# Patient Record
Sex: MALE | Race: WHITE | NOT HISPANIC OR LATINO | Employment: UNEMPLOYED | URBAN - METROPOLITAN AREA
[De-identification: names, ages, dates, MRNs, and addresses within clinical notes are randomized per-mention and may not be internally consistent; named-entity substitution may affect disease eponyms.]

---

## 2018-03-08 ENCOUNTER — OFFICE VISIT (OUTPATIENT)
Dept: FAMILY MEDICINE | Facility: CLINIC | Age: 18
End: 2018-03-08

## 2018-03-08 VITALS
WEIGHT: 166.8 LBS | HEIGHT: 71 IN | HEART RATE: 68 BPM | OXYGEN SATURATION: 98 % | TEMPERATURE: 97.7 F | DIASTOLIC BLOOD PRESSURE: 66 MMHG | SYSTOLIC BLOOD PRESSURE: 98 MMHG | BODY MASS INDEX: 23.35 KG/M2

## 2018-03-08 DIAGNOSIS — Z71.89 ACP (ADVANCE CARE PLANNING): ICD-10-CM

## 2018-03-08 DIAGNOSIS — Z91.018 TREE NUT ALLERGY: Primary | ICD-10-CM

## 2018-03-08 PROCEDURE — 99213 OFFICE O/P EST LOW 20 MIN: CPT | Performed by: FAMILY MEDICINE

## 2018-03-08 RX ORDER — EPINEPHRINE 0.3 MG/.3ML
0.3 INJECTION SUBCUTANEOUS PRN
Qty: 0.6 ML | Refills: 1 | Status: SHIPPED | OUTPATIENT
Start: 2018-03-08 | End: 2021-07-28

## 2018-03-08 NOTE — NURSING NOTE
Jurgen Wren is here today to receive a prescription for an Epi-Pen due to allergic reactions to Tree Nuts.    Pre-visit Screening:    Immunizations: not up to date - HPV Series not started (declines at this time)  Asthma Action Test/Plan:  NA  PHQ9:  NA  GAD7:  NA    Questioned patient about current smoking habits Pt. has never smoked.    Is it ok to leave a detailed message on cell phone's voice mail for today's visit only? Yes     Telephone Information:   Mobile [phone]       Nicole Silva [facility]

## 2018-03-08 NOTE — PROGRESS NOTES
"SUBJECTIVE:  Jurgen Wren, a 17 year old male scheduled an appointment to discuss the following issues:     Tree nut allergy  ACP (advance care planning)  Pt here to get epi pen-has never used before but tested positive for tree nut allergy by [facility] in 2011 (also shrimp)-has had reactions when he does have nuts-used to be mouth tingling and increased saliva- last 2 episodes his throat felt full or possibly swollen-no choking, no shortness of breath     Pt in college and will be studying in [place] next semester    Medical, social, surgical, and family histories reviewed.    ROS:  CONSTITUTIONAL: NEGATIVE for fever, chills  EYES: NEGATIVE for vision changes   RESP: NEGATIVE for significant cough or SOB  CV: NEGATIVE for chest pain, palpitations   GI: NEGATIVE for nausea, abdominal pain, heartburn, or change in bowel habits  : NEGATIVE for frequency, dysuria, or hematuria  MUSCULOSKELETAL: NEGATIVE for significant arthralgias or myalgia  NEURO: NEGATIVE for weakness, dizziness or paresthesias or headache    OBJECTIVE:  BP 98/66 (BP Location: Right arm, Patient Position: Chair, Cuff Size: Adult Large)  Pulse 68  Temp 97.7  F (36.5  C) (Oral)  Ht 1.803 m (5' 11\")  Wt 75.7 kg (166 lb 12.8 oz)  SpO2 98%  BMI 23.26 kg/m2  EXAM:  GENERAL APPEARANCE: healthy, alert and no distress  EYES: EOMI,  PERRL  HENT: ear canals and TM's normal and nose and mouth without ulcers or lesions  RESP: lungs clear to auscultation - no rales, rhonchi or wheezes  CV: regular rates and rhythm, normal S1 S2, no S3 or S4 and no murmur, click or rub -  ABDOMEN:  soft, nontender, no HSM or masses and bowel sounds normal    ASSESSMENT/PLAN:  (Z91.018) Tree nut allergy  (primary encounter diagnosis)  Comment: definitely at risk for anaphylaxis given progressing reactions-doubt allergy eval would be of much benefit at this junture-recommend always having epi pen  Plan: EPINEPHrine (ADRENACLICK) 0.3 MG/0.3ML         injection 2-pack        If " uses needs to go to ED immediately afterward for observation.    Of note-he has never had issues with shrimp or shellfish    (Z71.89) ACP (advance care planning)  Comment:   Plan:

## 2018-03-08 NOTE — MR AVS SNAPSHOT
"              After Visit Summary   3/8/2018    Jurgen Wren    MRN: [id number]           Patient Information     Date Of Birth          2000        Visit Information        Provider Department      3/8/2018 12:15 PM Andrei Burleson MD [facility]        Today's Diagnoses     Tree nut allergy    -  1    ACP (advance care planning)           Follow-ups after your visit        Who to contact     If you have questions or need follow up information about today's clinic visit or your schedule please contact [facility] directly at [phone].  Normal or non-critical lab and imaging results will be communicated to you by Xby[employer], letter or phone within 4 business days after the clinic has received the results. If you do not hear from us within 7 days, please contact the clinic through XbyMehart or phone. If you have a critical or abnormal lab result, we will notify you by phone as soon as possible.  Submit refill requests through [employer] or call your pharmacy and they will forward the refill request to us. Please allow 3 business days for your refill to be completed.          Additional Information About Your Visit        MyChart Information     [employer] lets you send messages to your doctor, view your test results, renew your prescriptions, schedule appointments and more. To sign up, go to [email or web address] . Click on \"Log in\" on the left side of the screen, which will take you to the Welcome page. Then click on \"Sign up Now\" on the right side of the page.     You will be asked to enter the access code listed below, as well as some personal information. Please follow the directions to create your username and password.     Your access code is: [email or web address]  Expires: 2018  1:03 PM     Your access code will  in 90 days. If you need help or a new code, please call your [place] clinic or [phone].        Care EveryWhere ID     This is your " "Care EveryWhere ID. This could be used by other organizations to access your [facility] medical records  [id number]        Your Vitals Were     Pulse Temperature Height Pulse Oximetry BMI (Body Mass Index)       68 97.7  F (36.5  C) (Oral) 1.803 m (5' 11\") 98% 23.26 kg/m2        Blood Pressure from Last 3 Encounters:   03/08/18 98/66   06/09/16 108/62   02/29/16 108/68    Weight from Last 3 Encounters:   03/08/18 75.7 kg (166 lb 12.8 oz) (68 %)*   06/09/16 71.4 kg (157 lb 6.4 oz) (65 %)*   02/29/16 69.1 kg (152 lb 6 oz) (60 %)*     * Growth percentiles are based on [facility] 2-20 Years data.              Today, you had the following     No orders found for display         Today's Medication Changes          These changes are accurate as of 3/8/18  1:03 PM.  If you have any questions, ask your nurse or doctor.               Start taking these medicines.        Dose/Directions    EPINEPHrine 0.3 MG/0.3ML injection 2-pack   Commonly known as:  ADRENACLICK   Used for:  Tree nut allergy   Started by:  Andrei Burleson MD        Dose:  0.3 mg   Inject 0.3 mLs (0.3 mg) into the muscle as needed for anaphylaxis   Quantity:  0.6 mL   Refills:  1            Where to get your medicines      Some of these will need a paper prescription and others can be bought over the counter.  Ask your nurse if you have questions.     Bring a paper prescription for each of these medications     EPINEPHrine 0.3 MG/0.3ML injection 2-pack                Primary Care Provider Office Phone # Fax #    Andrei Burleson [phone] [address] [address]        Equal Access to Services     ZEINA BOSWELL AH: Rena Hatfield, colby duncan, qaybta kaalmakathleen dailey, gretta garcia. So [facility] [phone].    ATENCIÓN: Si habla español, tiene a taylor disposición servicios gratuitos de asistencia lingüística. Lljeff al [phone].    We comply with applicable " federal civil rights laws and [place] laws. We do not discriminate on the basis of race, color, national origin, age, disability, sex, sexual orientation, or gender identity.            Thank you!     Thank you for choosing [facility]  for your care. Our goal is always to provide you with excellent care. Hearing back from our patients is one way we can continue to improve our services. Please take a few minutes to complete the written survey that you may receive in the mail after your visit with us. Thank you!             Your Updated Medication List - Protect others around you: Learn how to safely use, store and throw away your medicines at www.disposemymeds.org.          This list is accurate as of 3/8/18  1:03 PM.  Always use your most recent med list.                   Brand Name Dispense Instructions for use Diagnosis    EPINEPHrine 0.3 MG/0.3ML injection 2-pack    ADRENACLICK    0.6 mL    Inject 0.3 mLs (0.3 mg) into the muscle as needed for anaphylaxis    Tree nut allergy

## 2019-07-23 ENCOUNTER — OFFICE VISIT (OUTPATIENT)
Dept: FAMILY MEDICINE | Facility: CLINIC | Age: 19
End: 2019-07-23

## 2019-07-23 VITALS
DIASTOLIC BLOOD PRESSURE: 64 MMHG | BODY MASS INDEX: 23.78 KG/M2 | HEIGHT: 72 IN | SYSTOLIC BLOOD PRESSURE: 108 MMHG | RESPIRATION RATE: 16 BRPM | WEIGHT: 175.6 LBS | HEART RATE: 72 BPM | OXYGEN SATURATION: 98 % | TEMPERATURE: 98.3 F

## 2019-07-23 DIAGNOSIS — H61.23 BILATERAL IMPACTED CERUMEN: Primary | ICD-10-CM

## 2019-07-23 PROCEDURE — 99212 OFFICE O/P EST SF 10 MIN: CPT | Performed by: FAMILY MEDICINE

## 2019-07-23 ASSESSMENT — MIFFLIN-ST. JEOR: SCORE: 1836.58

## 2019-07-23 NOTE — PROGRESS NOTES
SUBJECTIVE: 18 year old male complaining of swimming yesterday and feeling like water is stuck in his left ear/ uses Q tips.   The patient describes decreases hearing and some ear canal itching.   The patient denies a history of pain or trauma.   Smoking history: No.   Relevant past medical history: positive for tree nut allergy.    OBJECTIVE: The patient appears healthy, alert, no distress, cooperative and smiling.   EARS: Rt TM: normal. Lt TM: normal, positive findings: cerumen bilaterally removed easily  NOSE/SINUS: Nares normal. Septum midline. Mucosa normal. No drainage or sinus tenderness.   THROAT: normal   NECK:Neck supple. No adenopathy. Thyroid symmetric, normal size,, Carotids without bruits.   CHEST: Clear    ASSESSMENT: (H61.23) Bilateral impacted cerumen  (primary encounter diagnosis)  Comment: prevention reviewed  Plan: HC REMOVE IMPACTED CERUMEN, BILATERAL

## 2019-07-23 NOTE — PATIENT INSTRUCTIONS
Bilateral impacted cerumen  (primary encounter diagnosis)  Comment: prevention reviewed  Plan: HC REMOVE IMPACTED CERUMEN, BILATERAL

## 2019-07-23 NOTE — NURSING NOTE
Jurgen is here today for a left plugged ear.    Pre-visit Screening:  Immunizations:  up to date  Colonoscopy:  NA  Mammogram: NA  Asthma Action Test/Plan:  NA  PHQ9:  PHQ 2 done today  GAD7:  NA  Questioned patient about current smoking habits Pt. has never smoked.  Ok to leave detailed message on voice mail for today's visit only Yes, phone # [phone]

## 2019-12-24 ENCOUNTER — TRANSFERRED RECORDS (OUTPATIENT)
Dept: FAMILY MEDICINE | Facility: CLINIC | Age: 19
End: 2019-12-24

## 2019-12-26 DIAGNOSIS — Z53.9 ERRONEOUS ENCOUNTER--DISREGARD: Primary | ICD-10-CM

## 2020-09-17 ENCOUNTER — TELEPHONE (OUTPATIENT)
Dept: FAMILY MEDICINE | Facility: CLINIC | Age: 20
End: 2020-09-17

## 2020-09-17 NOTE — TELEPHONE ENCOUNTER
Should be safe based on his previous health but since I haven't seen him since 3/2018 can't comment if new issues-I think it is fine though

## 2020-09-17 NOTE — TELEPHONE ENCOUNTER
Jurgen called to say that he has been having hair loss so he went on the Internet and purchased a topical solution along with a 1 mg finasteride tablet.  He would like to discuss whether or not this would be safe to take or more damaging?    Patient's phone #[phone]

## 2021-07-28 ENCOUNTER — OFFICE VISIT (OUTPATIENT)
Dept: FAMILY MEDICINE | Facility: CLINIC | Age: 21
End: 2021-07-28

## 2021-07-28 VITALS
HEIGHT: 72 IN | BODY MASS INDEX: 25.6 KG/M2 | OXYGEN SATURATION: 98 % | SYSTOLIC BLOOD PRESSURE: 124 MMHG | DIASTOLIC BLOOD PRESSURE: 68 MMHG | HEART RATE: 77 BPM | TEMPERATURE: 97.4 F | WEIGHT: 189 LBS

## 2021-07-28 DIAGNOSIS — Z91.018 TREE NUT ALLERGY: ICD-10-CM

## 2021-07-28 DIAGNOSIS — Z00.00 ENCOUNTER FOR GENERAL HEALTH EXAMINATION: Primary | ICD-10-CM

## 2021-07-28 DIAGNOSIS — L64.9 MALE PATTERN ALOPECIA: ICD-10-CM

## 2021-07-28 PROCEDURE — 99395 PREV VISIT EST AGE 18-39: CPT | Mod: 25 | Performed by: PHYSICIAN ASSISTANT

## 2021-07-28 PROCEDURE — 90715 TDAP VACCINE 7 YRS/> IM: CPT | Performed by: PHYSICIAN ASSISTANT

## 2021-07-28 PROCEDURE — 90471 IMMUNIZATION ADMIN: CPT | Performed by: PHYSICIAN ASSISTANT

## 2021-07-28 RX ORDER — FINASTERIDE 1 MG/1
1 TABLET, FILM COATED ORAL DAILY
COMMUNITY

## 2021-07-28 RX ORDER — EPINEPHRINE 0.3 MG/.3ML
0.3 INJECTION SUBCUTANEOUS ONCE
Qty: 0.6 ML | Refills: 0 | Status: SHIPPED | OUTPATIENT
Start: 2021-07-28 | End: 2021-07-28

## 2021-07-28 ASSESSMENT — MIFFLIN-ST. JEOR: SCORE: 1890.3

## 2021-07-28 NOTE — NURSING NOTE
Chief Complaint   Patient presents with     Physical     non fasting         Pre-visit Screening:  Immunizations:  Not up to date- tdap today  Colonoscopy:  NA  Mammogram: NA  Asthma Action Test/Plan:  NA  PHQ9:  NA  GAD7:  NA  Questioned patient about current smoking habits Pt. has never smoked.  Ok to leave detailed message on voice mail for today's visit only Yes, phone # cell

## 2021-07-28 NOTE — PROGRESS NOTES
Jurgen Wren is a 21 year old male presents for routine health maintenance.    Current concerns:   Takes finasteride for medication called Keeps. This was started 9/2020. Denies any side effects.     Body mass index is 25.63 kg/m .    Present exercise habits:  3 times/week  Present dietary habits:  eats regular meals and follows a balanced nutrition diet    Vit D intake: is taking supplement    Is the patient a smoker? No  If yes, smoking cessation advised and counseling provided.     Cardiovascular risk factors: none    Over the past few weeks, have you felt down or depressed? Little interest or pleasure in doing things? No concerns    Last dental appointment:  last year  Last optical appointment:  3 years ago    Was the patient born between [birth year] and has not had Hep C testing?  No, not applicable    I have reviewed the following histories: Past Medical History, Past Surgical History, Social History, Family History, Problem List, Medication List and Allergies    Past Medical History:   Diagnosis Date     Tree nut allergy 3/8/2018     Family History   Problem Relation Age of Onset     Asthma No family hx of      Diabetes No family hx of      Cancer - colorectal No family hx of      Prostate Cancer No family hx of      Social History     Socioeconomic History     Marital status: Single     Spouse name: Not on file     Number of children: Not on file     Years of education: Not on file     Highest education level: Not on file   Occupational History     Occupation: college     Employer: [employer]     Comment: [employer]   Tobacco Use     Smoking status: Never Smoker     Smokeless tobacco: Never Used   Substance and Sexual Activity     Alcohol use: Yes     Alcohol/week: 3.0 standard drinks     Types: 3 Standard drinks or equivalent per week     Drug use: No     Sexual activity: Never     Partners: Female   Other Topics Concern      Service Not Asked     Blood Transfusions Not Asked     Caffeine  Concern Not Asked     Occupational Exposure Not Asked     Hobby Hazards Not Asked     Sleep Concern Not Asked     Stress Concern Not Asked     Weight Concern Not Asked     Special Diet Not Asked     Back Care Not Asked     Exercise Yes     Bike Helmet Not Asked     Seat Belt Yes     Self-Exams Not Asked   Social History Narrative     Not on file     Social Determinants of Health     Financial Resource Strain:      Difficulty of Paying Living Expenses:    Food Insecurity:      Worried About Running Out of Food in the Last Year:      Ran Out of Food in the Last Year:    Transportation Needs:      Lack of Transportation (Medical):      Lack of Transportation (Non-Medical):    Physical Activity:      Days of Exercise per Week:      Minutes of Exercise per Session:    Stress:      Feeling of Stress :    Social Connections:      Frequency of Communication with Friends and Family:      Frequency of Social Gatherings with Friends and Family:      Attends Baptism Services:      Active Member of Clubs or Organizations:      Attends Club or Organization Meetings:      Marital Status:    Intimate Partner Violence:      Fear of Current or Ex-Partner:      Emotionally Abused:      Physically Abused:      Sexually Abused:      Patient Active Problem List   Diagnosis     Allergic rhinitis     Health Care Home     ACP (advance care planning)     Tree nut allergy     Current Outpatient Medications   Medication     EPINEPHrine (ADRENACLICK) 0.3 MG/0.3ML injection 2-pack     finasteride (PROPECIA) 1 MG tablet     No current facility-administered medications for this visit.       Allergies:    Allergies   Allergen Reactions     Nuts [Peanut-Derived] Anaphylaxis and Swelling     Tree nuts, nausea, tingling tongue         ROS:  E/M: NEGATIVE for ear, nose, mouth and throat problems  R: NEGATIVE for significant/chronic cough or SOB  CV: NEGATIVE for chest pain or palpitations  GI: NEGATIVE for abdominal pain, chronic diarrhea or  constipation  : NEGATIVE for dysuria, hematuria, weakened urinary stream      OBJECTIVE:    Vitals:    07/28/21 1112   BP: 124/68   BP Location: Left arm   Patient Position: Sitting   Cuff Size: Adult Large   Pulse: 77   Temp: 97.4  F (36.3  C)   TempSrc: Temporal   SpO2: 98%   Weight: 85.7 kg (189 lb)   Height: 1.829 m (6')       General: 21 year old male who appears his stated age. Vital signs noted.  Head: Normocephalic  Eyes: pupils equal round reactive to light and accomodation, extra ocular movements intact  Ears: external canals and tms free of abnormalities  Nose: patent, without mucosal abnormalities  Mouth and throat: without erythema or lesions of the mucosa  Neck: supple, without adenopathy or thyromegaly  Lungs: clear to auscultation, no wheezing or crackles  Cv: regular rate and rhythm, normal s1 and s2 without murmur or click  Abd: soft, non-tender, no masses, no hepatomegaly or splenomegaly.  Gu: normal external genitalia, no hernia  Rectal: Not indicated  Ms: normal muscle tone & symmetry  Skin: Clear to inspection  Neuro: sensation and motor function grossly intact; cranial nerves without obvious abnormalities.    ASSESSMENT/PLAN:    1. Encounter for general health examination  Jurgen is doing well today. Will wait until next physical for fasting labs. Contact us sooner if concerns. Updating tetanus today.     2. Tree nut allergy  - EPINEPHrine (ADRENACLICK) 0.3 MG/0.3ML injection 2-pack; Inject 0.3 mLs (0.3 mg) into the muscle  Dispense: 0.6 mL; Refill: 1    3. Male pattern alopecia  Doing well on finasteride. Spent time discussing safety profile, side effects. Agreed that low risk is reasonable to accept. Okay to continue using through Keeps.     reports that he has never smoked. He has never used smokeless tobacco.    Estimated body mass index is 25.63 kg/m  as calculated from the following:    Height as of this encounter: 1.829 m (6').    Weight as of this encounter: 85.7 kg (189 lb).    Labs  pending:      Fasting glucose      Fasting lipids  Meds Suggested:      Vitamin D       Calcium  Tests Recommended:      Regular Dental Examinations        Eye exam  Behavior Modifications:       Cardiovascular exercise 3 times per week--enough to get your Target Heart rate  Other recommendations:     BMI noted and discussed      Regular testicle exam     Encouraged My Chart    The patient will return to the clinic if symptoms are changing or concern with follow up as discussed. The patient understands and agrees with the plan.    Bettina Lazo PA-C  7/28/2021      Counseling Resources:  ATP IV Guidelines  Pooled Cohorts Equation Calculator  Breast Cancer Risk Calculator  FRAX Risk Assessment  ICSI Preventive Guidelines  Dietary Guidelines for Americans, 2010  USDA's MyPlate

## 2023-09-04 ENCOUNTER — OFFICE VISIT (OUTPATIENT)
Dept: URGENT CARE | Facility: URGENT CARE | Age: 23
End: 2023-09-04
Payer: COMMERCIAL

## 2023-09-04 VITALS
HEART RATE: 66 BPM | DIASTOLIC BLOOD PRESSURE: 60 MMHG | OXYGEN SATURATION: 97 % | TEMPERATURE: 97.3 F | SYSTOLIC BLOOD PRESSURE: 124 MMHG

## 2023-09-04 DIAGNOSIS — B88.8 INFESTATION BY BED BUG: Primary | ICD-10-CM

## 2023-09-04 PROCEDURE — 99213 OFFICE O/P EST LOW 20 MIN: CPT | Performed by: PHYSICIAN ASSISTANT

## 2023-09-04 RX ORDER — TRIAMCINOLONE ACETONIDE 1 MG/G
CREAM TOPICAL 2 TIMES DAILY
Qty: 80 G | Refills: 0 | Status: SHIPPED | OUTPATIENT
Start: 2023-09-04

## 2023-09-04 ASSESSMENT — ENCOUNTER SYMPTOMS: COLOR CHANGE: 1

## 2023-09-04 NOTE — PROGRESS NOTES
Assessment & Plan          1. Infestation by bed bug    - triamcinolone (KENALOG) 0.1 % external cream; Apply topically 2 times daily Until symptoms resolve, up to 3 weeks.  Dispense: 80 g; Refill: 0    Patient Instructions   Follow up in the clinic with worsening symptoms    Return if symptoms worsen or fail to improve, for Follow up.    At the end of the encounter, I discussed results, diagnosis, medications. Discussed red flags for immediate return to clinic/ER, as well as indications for follow up if no improvement. Patient understood and agreed to plan. Patient was stable for discharge.    Naveen Seaman is a 23 year old male who presents to clinic today for the following health issues:  Chief Complaint   Patient presents with    Urgent Care    Insect Bite     Possible spider bite or bed bug. Notice the yesterday on the way home. Applied anti-itch cream     HPI    Patient reports rash since one day ago. He has rash on the right hand, left elbow and left flank. Patient travelled to [place] and stayed at \[facility]"" for 3 days. He noticed the rash after checking out. He reports possible bug bites or bed bugs. He notes rash is itchy. He has been using the anti itch cream which helped somewhat. Denies fever and chills.     Review of Systems   Skin:  Positive for color change and rash.       Problem List:  2018-03: ACP (advance care planning)  2018-03: Tree nut allergy  2013-10: Health Care Home  2007-07: Viral warts  2006-10: Allergic rhinitis      Past Medical History:   Diagnosis Date    Tree nut allergy 3/8/2018       Social History     Tobacco Use    Smoking status: Never     Passive exposure: Never    Smokeless tobacco: Never   Substance Use Topics    Alcohol use: Yes     Alcohol/week: 3.0 standard drinks of alcohol     Types: 3 Standard drinks or equivalent per week           Objective    /60   Pulse 66   Temp 97.3  F (36.3  C) (Tympanic)   SpO2 97%   Physical Exam  Constitutional:        Appearance: Normal appearance.   HENT:      Head: Normocephalic.   Cardiovascular:      Rate and Rhythm: Normal rate and regular rhythm.   Pulmonary:      Effort: Pulmonary effort is normal.      Breath sounds: Normal breath sounds.   Musculoskeletal:      Cervical back: Normal range of motion and neck supple.   Lymphadenopathy:      Head:      Right side of head: No submental, submandibular or tonsillar adenopathy.      Left side of head: No submental, submandibular or tonsillar adenopathy.   Skin:     General: Skin is warm and dry.      Findings: Erythema and rash present. Rash is nodular.          Neurological:      Mental Status: He is alert and oriented to person, place, and time.           Media Information    Document Information    Other: Photograph      09/04/2023 1:27 PM   Attached To:   Office Visit on 9/4/23 with Macey Collier PA-C   Source Information    Macey Collier PA-C [facility]        Media Information    Document Information    Other: Photograph      09/04/2023 1:27 PM   Attached To:   Office Visit on 9/4/23 with Macey Collier PA-C   Source Information    Macey Collier PA-C [facility]        Media Information    Document Information    Other: Photograph      09/04/2023 1:27 PM   Attached To:   Office Visit on 9/4/23 with Macey Collier PA-C   Source Information    Macey Collier PA-C [facility]     Macey Collier PA-C